# Patient Record
Sex: MALE | Race: WHITE | URBAN - METROPOLITAN AREA
[De-identification: names, ages, dates, MRNs, and addresses within clinical notes are randomized per-mention and may not be internally consistent; named-entity substitution may affect disease eponyms.]

---

## 2018-11-09 ENCOUNTER — NURSE ONLY (OUTPATIENT)
Dept: FAMILY MEDICINE CLINIC | Age: 28
End: 2018-11-09

## 2018-11-09 DIAGNOSIS — L72.3 SEBACEOUS CYST: Primary | ICD-10-CM

## 2022-12-28 ENCOUNTER — OFFICE VISIT (OUTPATIENT)
Dept: PRIMARY CARE CLINIC | Age: 32
End: 2022-12-28
Payer: COMMERCIAL

## 2022-12-28 VITALS
BODY MASS INDEX: 22.53 KG/M2 | HEART RATE: 96 BPM | SYSTOLIC BLOOD PRESSURE: 112 MMHG | DIASTOLIC BLOOD PRESSURE: 75 MMHG | HEIGHT: 73 IN | WEIGHT: 170 LBS | OXYGEN SATURATION: 98 %

## 2022-12-28 DIAGNOSIS — L08.9 INFECTED CYST OF SKIN: Primary | ICD-10-CM

## 2022-12-28 DIAGNOSIS — L72.9 INFECTED CYST OF SKIN: Primary | ICD-10-CM

## 2022-12-28 DIAGNOSIS — Z23 NEED FOR TETANUS BOOSTER: ICD-10-CM

## 2022-12-28 PROCEDURE — 90471 IMMUNIZATION ADMIN: CPT | Performed by: NURSE PRACTITIONER

## 2022-12-28 PROCEDURE — 99204 OFFICE O/P NEW MOD 45 MIN: CPT | Performed by: NURSE PRACTITIONER

## 2022-12-28 PROCEDURE — 90715 TDAP VACCINE 7 YRS/> IM: CPT | Performed by: NURSE PRACTITIONER

## 2022-12-28 RX ORDER — CEPHALEXIN 500 MG/1
500 CAPSULE ORAL 3 TIMES DAILY
Qty: 30 CAPSULE | Refills: 0 | Status: SHIPPED | OUTPATIENT
Start: 2022-12-28 | End: 2023-01-07

## 2022-12-28 SDOH — ECONOMIC STABILITY: FOOD INSECURITY: WITHIN THE PAST 12 MONTHS, YOU WORRIED THAT YOUR FOOD WOULD RUN OUT BEFORE YOU GOT MONEY TO BUY MORE.: NEVER TRUE

## 2022-12-28 SDOH — ECONOMIC STABILITY: FOOD INSECURITY: WITHIN THE PAST 12 MONTHS, THE FOOD YOU BOUGHT JUST DIDN'T LAST AND YOU DIDN'T HAVE MONEY TO GET MORE.: NEVER TRUE

## 2022-12-28 ASSESSMENT — PATIENT HEALTH QUESTIONNAIRE - PHQ9
SUM OF ALL RESPONSES TO PHQ QUESTIONS 1-9: 0
SUM OF ALL RESPONSES TO PHQ9 QUESTIONS 1 & 2: 0
SUM OF ALL RESPONSES TO PHQ QUESTIONS 1-9: 0
2. FEELING DOWN, DEPRESSED OR HOPELESS: 0
1. LITTLE INTEREST OR PLEASURE IN DOING THINGS: 0

## 2022-12-28 ASSESSMENT — ENCOUNTER SYMPTOMS
VOMITING: 0
COUGH: 0
NAUSEA: 0
EYE DISCHARGE: 0
COLOR CHANGE: 1
EYE REDNESS: 0
RHINORRHEA: 0

## 2022-12-28 ASSESSMENT — SOCIAL DETERMINANTS OF HEALTH (SDOH): HOW HARD IS IT FOR YOU TO PAY FOR THE VERY BASICS LIKE FOOD, HOUSING, MEDICAL CARE, AND HEATING?: NOT HARD AT ALL

## 2022-12-28 NOTE — PROGRESS NOTES
[address] [facility]  [address]  [place] [address]  Dept: [phone]  Dept Fax: [phone]    Lulú Murdock is a 27 y.o. male who presents today for his medical conditions/complaints of   Chief Complaint   Patient presents with    Cyst     Pt has had cyst on his right shoulder recently it has become painful and red X 1 year           HPI:     /75 (Site: Left Upper Arm, Position: Sitting, Cuff Size: Large Adult)   Pulse 96   Ht 6' 1\" (1.854 m)   Wt 170 lb (77.1 kg)   SpO2 98%   BMI 22.43 kg/m²       HPI  Pt presented to the clinic today with c/o cyst on right shoulder which has been present for over a year. This is a chronic problem. The current episode started 2 days ago. The problem has been worsening since onset. Associated symptoms include: redness, warmth, painful . Pertinent negatives include: No fever, drainage, nausea, vomiting, numbness . Pt has tried poking the cyst with no improvement. T-DAP is not up to date. No past medical history on file. No past surgical history on file. No family history on file. Social History     Tobacco Use    Smoking status: Never    Smokeless tobacco: Never   Substance Use Topics    Alcohol use: Not on file        Prior to Visit Medications    Medication Sig Taking? Authorizing Provider   cephALEXin (KEFLEX) 500 MG capsule Take 1 capsule by mouth 3 times daily for 10 days Yes KATELYNN Pelletier - CNP       No Known Allergies      Subjective:      Review of Systems   Constitutional:  Negative for chills and fever. HENT:  Negative for congestion and rhinorrhea. Eyes:  Negative for discharge and redness. Respiratory:  Negative for cough. Cardiovascular:  Negative for chest pain. Gastrointestinal:  Negative for nausea and vomiting. Genitourinary:  Negative for decreased urine volume and difficulty urinating.    Musculoskeletal:  Negative for gait problem, myalgias and neck stiffness. Skin:  Positive for color change. Neurological:  Negative for weakness, light-headedness and headaches. Psychiatric/Behavioral:  Negative for sleep disturbance. Objective:     Physical Exam  Vitals and nursing note reviewed. Constitutional:       General: He is not in acute distress. Appearance: Normal appearance. HENT:      Head: Normocephalic and atraumatic. Right Ear: Tympanic membrane and ear canal normal.      Left Ear: Tympanic membrane and ear canal normal.      Nose: Nose normal.      Mouth/Throat:      Lips: Pink. Mouth: Mucous membranes are moist.      Pharynx: Oropharynx is clear. Uvula midline. Eyes:      Extraocular Movements: Extraocular movements intact. Conjunctiva/sclera: Conjunctivae normal.   Cardiovascular:      Rate and Rhythm: Normal rate and regular rhythm. Pulses: Normal pulses. Pulmonary:      Effort: Pulmonary effort is normal.      Breath sounds: Normal breath sounds. Abdominal:      General: Bowel sounds are normal.      Palpations: Abdomen is soft. Musculoskeletal:         General: Normal range of motion. Cervical back: Normal range of motion and neck supple. Skin:     General: Skin is warm and dry. Capillary Refill: Capillary refill takes less than 2 seconds. Comments: Right upper shoulder cyst with surrounding erythema. See images uploaded into media file. Neurological:      Mental Status: He is alert and oriented to person, place, and time. Coordination: Coordination normal.      Gait: Gait normal.   Psychiatric:         Mood and Affect: Mood normal.         Thought Content: Thought content normal.         MEDICAL DECISION MAKING Assessment/Plan:     Calos Shin was seen today for cyst.    Diagnoses and all orders for this visit:    Infected cyst of skin  -     cephALEXin (KEFLEX) 500 MG capsule;  Take 1 capsule by mouth 3 times daily for 10 days  -     Tetanus-Diphth-Acell Pertussis (BOOSTRIX) injection 0.5 mL  -     414 Alessia Wisnton MD, Hernia and [facility] [address] [place] [address]    Need for tetanus booster  -     Tetanus-Diphth-Acell Pertussis (BOOSTRIX) injection 0.5 mL    Administrations This Visit       Tetanus-Diphth-Acell Pertussis (BOOSTRIX) injection 0.5 mL       Admin Date  12/28/2022  10:34 Action  Given Dose  0.5 mL Route  IntraMUSCular Site  Deltoid Left Administered By  [facility]    Ordering Provider: KATELYNN Barry CNPBashir Opa[address] 47: [id number]    Lot#:     : Espir[employer]ion Folk    Patient Supplied?: No                  No results found for this or any previous visit. Based on the history and exam, will treat with Keflex for infected cyst.   Pt to fill and take medication as prescribed. May take Motrin or Tylenol as directed on the bottle for pain. Referral placed to general surgery Dr. Sonya Balderas for evaluation and removal.   T-DAP updated in the office today. Pt tolerated well. Pt to return if symptoms are not improving or worsening. Go to the ER for any emergent concern. Patient given educational materials - see patientinstructions. Discussed use, benefit, and side effects of prescribed medications. All patient questions answered. Pt verbalized understanding. Instructed to continue current medications, diet and exercise. Patient agreed with treatment plan. Follow up as directed.      Electronically signed by KATELYNN Adhikari CNP on 12/28/2022 at 10:40 AM

## 2023-01-11 ENCOUNTER — HOSPITAL ENCOUNTER (OUTPATIENT)
Dept: SURGERY | Age: 33
Discharge: HOME | End: 2023-01-11
Payer: COMMERCIAL

## 2023-01-11 VITALS
BODY MASS INDEX: 22.93 KG/M2 | DIASTOLIC BLOOD PRESSURE: 65 MMHG | WEIGHT: 173 LBS | SYSTOLIC BLOOD PRESSURE: 112 MMHG | HEART RATE: 71 BPM | HEIGHT: 73 IN

## 2023-01-11 DIAGNOSIS — L72.0 INFECTED EPIDERMOID CYST: ICD-10-CM

## 2023-01-11 DIAGNOSIS — L08.9 INFECTED EPIDERMOID CYST: ICD-10-CM

## 2023-01-11 PROCEDURE — 99203 OFFICE O/P NEW LOW 30 MIN: CPT | Performed by: SURGERY

## 2023-01-11 PROCEDURE — 99202 OFFICE O/P NEW SF 15 MIN: CPT

## 2023-01-11 NOTE — H&P
[facility] General Surgery  History & Physical  Riana Lawrence DO    Pt Name: Nicola Gonzalez  MRN: [[id number]]  YOB: 1990  Date of evaluation: 1/11/2023  Primary Care Physician: No primary care provider on file. Chief Complaint: infected epidermoid cyst      SUBJECTIVE:    History of Present Illness: This is a 27 y.o.  male who presents for evaluation for the above, pt reports infection and rupture sometime around jill, was see in  and prescribed antibiotics. Pt reports his wife is a nurse, he states she has been regularly manually expressing this area, he has been diligent with local hygiene. No prior history of this, no other complaints at this time    Chart review performed to add information to the HPI: Yes    Past Medical History   has no past medical history on file. Past Surgical History   has no past surgical history on file. Family History  family history is not on file. Social History  Tobacco use:  reports that he has never smoked. He has never used smokeless tobacco.  Alcohol use:  reports that he does not currently use alcohol. Drug use:  has no history on file for drug use. Medications  Current Medications:   No current outpatient medications on file. No current facility-administered medications for this encounter. Home Medications:   Prior to Admission medications    Not on File       Allergies  Patient has no known allergies. Review of Systems:  General: Denies any fever, chills. Eyes: Denies any changes in vision, diplopia or eye pain  Ears, Nose, Mouth: Denies changes in hearing/tinnitus or drainage from ears, no rhinorrhea or bloody nose, no difficulty chewing  Throat: no difficulty swallowing, no throat pain  Respiratory: Denies any shortness of breath or cough. Cardiac: Denies any chest pain, palpitations, claudication or edema. Gastrointestinal:  Denies any melena, hematochezia, hematemesis or pyrosis.   Genitourinary: Denies any frequency, urgency, hesitancy or incontinence. Musculoskeletal: Denies worsening muscle weakness or recent trauma  Skin: skin infection  Psychiatric: Denies any recent changes in mood or affect  Hematologic: Denies bruising or bleeding easily. PHYSICAL EXAMINATION  Vitals:   Vitals:    01/11/23 1422   BP: 112/65   Pulse: 71       General Appearance:  awake, alert, no acute distress, well developed, well nourished   Skin:  R posterior shoulder with what appears to be recent rupture of epidermoid cyst, the area is clearly healing without issue, there does not appear to be any residual cyst cavity or wound cavity, no evidence of bleeding/infection  Head/face:  NCAT, face symmetrical  Eyes:  PERRL, no evidence of conjunctivitis or ptosis bilaterally  Ears:  External ears and canals grossly normal, no evidence of otorrhea. Nose/Sinuses:  Nares normal. Septum midline. Mucosa normal. No external drainage noted. Mouth/Neck:  Mucosa moist.  No external oral lesions. Trachea midline. No visible masses. Lungs:  Normal chest expansion, unlabored breathing without accessory muscle use. No audible rales, rhonchi, or wheezing. Cardiovascular: S1S2. No evidence of JVD. No evidence of pulsatile masses in abdomen  Abdomen:  Soft, non-tender, no organomegaly, no masses. Musculoskeletal: No evidence of bony/muscular deformities, trauma, atrophy of either left/right upper/lower extremity. No evidence of digital clubbing or cyanosis. Neurologic:  CN 2-12 grossly intact without obvious deficits. Grossly normal sensation in all extremities.   Psychiatric: appropriate judgement and insight, appropriate recall of recent and remote memory, no evidence of depression/anxiety/agitation        DIAGNOSES:  Active Hospital Problems    Diagnosis Date Noted    Infected epidermoid cyst [L72.0, L08.9] 01/11/2023     Priority: Medium         PLAN:  No indications for surgical intervention, continue to wash the area with soap/water, cover as needed until drainage stops. Can f/u with us if this cyst recurs, otherwise as needed. All questions were answered, pt is agreeable to this plan.         Medical Decision Making: low complexity    Electronically signed by Adam Hammer DO on 1/11/2023 at 4:22 PM